# Patient Record
Sex: MALE | Race: WHITE | Employment: FULL TIME | URBAN - METROPOLITAN AREA
[De-identification: names, ages, dates, MRNs, and addresses within clinical notes are randomized per-mention and may not be internally consistent; named-entity substitution may affect disease eponyms.]

---

## 2023-03-07 ENCOUNTER — HOSPITAL ENCOUNTER (EMERGENCY)
Age: 68
Discharge: HOME | End: 2023-03-07
Attending: EMERGENCY MEDICINE

## 2023-03-07 ENCOUNTER — APPOINTMENT (OUTPATIENT)
Dept: GENERAL RADIOLOGY | Age: 68
End: 2023-03-07

## 2023-03-07 VITALS
TEMPERATURE: 98.6 F | HEART RATE: 60 BPM | OXYGEN SATURATION: 96 % | DIASTOLIC BLOOD PRESSURE: 88 MMHG | RESPIRATION RATE: 15 BRPM | SYSTOLIC BLOOD PRESSURE: 125 MMHG

## 2023-03-07 DIAGNOSIS — S83.91XA SPRAIN OF RIGHT KNEE, UNSPECIFIED LIGAMENT, INITIAL ENCOUNTER: Primary | ICD-10-CM

## 2023-03-07 PROCEDURE — 99283 EMERGENCY DEPT VISIT LOW MDM: CPT

## 2023-03-07 PROCEDURE — 73562 X-RAY EXAM OF KNEE 3: CPT

## 2023-03-07 PROCEDURE — 6370000000 HC RX 637 (ALT 250 FOR IP)

## 2023-03-07 RX ORDER — HYDROCODONE BITARTRATE AND ACETAMINOPHEN 5; 325 MG/1; MG/1
1 TABLET ORAL
Status: COMPLETED | OUTPATIENT
Start: 2023-03-07 | End: 2023-03-07

## 2023-03-07 RX ADMIN — HYDROCODONE BITARTRATE AND ACETAMINOPHEN 1 TABLET: 5; 325 TABLET ORAL at 11:02

## 2023-03-07 ASSESSMENT — ENCOUNTER SYMPTOMS
SINUS PRESSURE: 0
EYE REDNESS: 0
ABDOMINAL PAIN: 0
COUGH: 0
SHORTNESS OF BREATH: 0
WHEEZING: 0
COLOR CHANGE: 0
VOMITING: 0
CONSTIPATION: 0
CHEST TIGHTNESS: 0
NAUSEA: 0
BACK PAIN: 0
PHOTOPHOBIA: 0
SINUS PAIN: 0

## 2023-03-07 NOTE — ED TRIAGE NOTES
Pt arrives to triage in a wheelchair. Pt reports he was stepping down off of a truck and felt his R knee \"go out of place and then back in\" and now has severe pain and cannot bear weight on the knee. Pt states this happened about one hour ago.

## 2023-03-07 NOTE — ED PROVIDER NOTES
Emergency Department Provider Note                   PCP:                Jackeline Mcpherson MD               Age: 79 y.o. Sex: male     DISPOSITION Decision To Discharge 03/07/2023 11:21:22 AM       ICD-10-CM    1. Sprain of right knee, unspecified ligament, initial encounter  S83. [employer] Knee Immobilizer, Right; 16\"; Crutches; Pair, Right Side Injury; Med (5'2\"-5'10\")     [facility] [employer]           [address]      Category 3: Discussion of management or test interpretation. 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 Differential diagnosis considered include but is not limited to ligamentous tear, fracture, soft tissue injury. Physical exam shows negative Lachman's and posterior drawer test.  He is no erythema, laceration, or cellulitis to the knee. X-ray shows no acute fracture or dislocation. We will treat the patient with knee immobilizer and crutches and give urgent referral to orthopedics for further evaluation. We discussed return precautions and conservative care measures. Patient verbalizes understanding agreement with the plan. ED Course as of 03/07/23 1857   Tue Mar 07, 2023   1050 IMPRESSION:  1. No dislocation or displaced fracture. 2.  Early medial compartment joint space narrowing. [KS]      ED Course User Index  [KS] SARITA Lopez       Risk of Complications and/or Morbidity of Patient Management:  OTC drug management performed     Is this patient to be included in the SEP-1 core measure due to severe sepsis or septic shock? No Exclusion criteria - the patient is NOT to be included for SEP-1 Core Measure due to:  Infection is not suspected     Catherine Hogue is a 79 y.o. male who presents to the Emergency Department with chief complaint of    Chief Complaint   Patient presents with    Knee Pain      Patient is a 31-year-old male with a past medical history of hypertension and hyperlipidemia presents today due to right knee pain after climbing down from his truck and feeling a pop. Patient was climbing down from the top of his truck when he stepped down on the ground and felt his right knee \"pop\" and slide out of place. He then fell. He denies hitting his head or losing consciousness. He denies hurting anywhere else on his body. Patient has been unable to put any pressure or walk on his right knee due to pain. Is a 5 out of 10 in severity. He states his kneecap slid out of place but it is since gone back into place. He has had no history of trauma or surgery to this right knee. He denies all other symptoms at this time. The history is provided by the patient. No  was used. Review of Systems   Constitutional:  Negative for chills and fatigue. HENT:  Negative for congestion, sinus pressure, sinus pain and sneezing. Eyes:  Negative for photophobia and redness. Respiratory:  Negative for cough, chest tightness, shortness of breath and wheezing. Cardiovascular:  Negative for chest pain and leg swelling. Gastrointestinal:  Negative for abdominal pain, constipation, nausea and vomiting. Genitourinary:  Negative for dysuria, flank pain and hematuria. Musculoskeletal:  Positive for arthralgias, gait problem and joint swelling. Negative for back pain, neck pain and neck stiffness. Skin:  Negative for color change. Neurological:  Negative for dizziness, light-headedness, numbness and headaches. All other systems reviewed and are negative. Vitals signs and nursing note reviewed. No data found. Physical Exam  Vitals and nursing note reviewed. Constitutional:       General: He is not in acute distress. Appearance: Normal appearance. He is not ill-appearing, toxic-appearing or diaphoretic.    HENT: Head: Normocephalic and atraumatic. Eyes:      General: No scleral icterus. Right eye: No discharge. Left eye: No discharge. Extraocular Movements: Extraocular movements intact. Conjunctiva/sclera: Conjunctivae normal.      Pupils: Pupils are equal, round, and reactive to light. Neck:      Comments: No midline spinal tenderness of the cervical, thoracic, or lumbar spine. Cardiovascular:      Rate and Rhythm: Normal rate and regular rhythm. Pulses: Normal pulses. Heart sounds: Normal heart sounds. No murmur heard. No friction rub. No gallop. Comments: 2+ dorsalis pedis pulses bilaterally. Pulmonary:      Effort: Pulmonary effort is normal. No respiratory distress. Breath sounds: Normal breath sounds. No stridor. No wheezing or rales. Musculoskeletal:         General: Swelling, tenderness and signs of injury present. No deformity. Cervical back: Normal range of motion and neck supple. No rigidity or tenderness. Right lower leg: No edema. Left lower leg: No edema. Comments: Decreased active range of motion in right knee due to pain. Normal passive range of motion in right knee. Moderate tenderness to palpation to lateral right knee. Negative Lachman's and posterior drawer test.  Patella appears to be in anatomic position. No erythema, cellulitis, or lacerations noted. No pain to palpation of patient's right hip or right ankle. Normal range of motion of upper limbs bilaterally. Lymphadenopathy:      Cervical: No cervical adenopathy. Skin:     General: Skin is warm and dry. Capillary Refill: Capillary refill takes less than 2 seconds. Neurological:      General: No focal deficit present. Mental Status: He is alert and oriented to person, place, and time. Mental status is at baseline.       Gait: Gait abnormal.   Psychiatric:         Mood and Affect: Mood normal.         Behavior: Behavior normal.         Thought Content: Thought content normal.         Judgment: Judgment normal.        Procedures    ED Course as of 03/07/23 1857   Tue Mar 07, 2023   1050 IMPRESSION:  1.  No dislocation or displaced fracture.  2.  Early medial compartment joint space narrowing. [KS]      ED Course User Index  [KS] SARITA Solorzano        Orders Placed This Encounter   Procedures    XR KNEE RIGHT (3 VIEWS)    [facility]    [facility] Knee Immobilizer, Right; 16\"; Crutches; Pair, Right Side Injury; Med (5'2\"-5'10\")        Medications   HYDROcodone-acetaminophen (NORCO) 5-325 MG per tablet 1 tablet (1 tablet Oral Given 3/7/23 1102)       There are no discharge medications for this patient.       No past medical history on file.     No past surgical history on file.     No family history on file.     Social History     Socioeconomic History    Marital status:         Allergies: Patient has no known allergies.    There are no discharge medications for this patient.       Results for orders placed or performed during the hospital encounter of 03/07/23   XR KNEE RIGHT (3 VIEWS)    Narrative    EXAMINATION: XR KNEE RIGHT (3 VIEWS) 3/7/2023 10:25 AM    ACCESSION NUMBER: [id number]    COMPARISON: None available    INDICATION: fall, \"popping\" sensation    TECHNIQUE: 3 views of the right knee were obtained.     FINDINGS:   No acute fracture or dislocation. Mild medial compartment joint space narrowing.  No evidence of knee joint effusion. Quadriceps enthesopathy        Impression    1.  No dislocation or displaced fracture.  2.  Early medial compartment joint space narrowing.            XR KNEE RIGHT (3 VIEWS)   Final Result   1.  No dislocation or displaced fracture.   2.  Early medial compartment joint space narrowing.                              Voice dictation software was used during the making of this note.  This software is not perfect and grammatical and other  typographical errors may be present. This note has not been completely proofread for errors.        SARITA Whyte  03/07/23 [id number]

## 2023-03-07 NOTE — DISCHARGE INSTRUCTIONS
Rest, ice, and elevate your right knee. Use Tylenol and ibuprofen to help with your pain. Wear your knee immobilizer and use the crutches when you are walking around. If you have any new or worsening symptoms, please return here for further evaluation. Please be sure to follow-up with orthopedics for further evaluation of your knee. We would love to help you get a primary care doctor for follow-up after your emergency department visit. Please call [phone] between 7AM - 6PM Monday to Friday. A care navigator will be able to assist you with setting up a doctor close to your home.

## 2023-03-07 NOTE — ED NOTES
I have reviewed discharge instructions with the patient. The patient verbalized understanding. Patient left ED via Discharge Method: crutches to Home with friend. Opportunity for questions and clarification provided. Patient given 2 scripts. To continue your aftercare when you leave the hospital, you may receive an automated call from our care team to check in on how you are doing. This is a free service and part of our promise to provide the best care and service to meet your aftercare needs.  If you have questions, or wish to unsubscribe from this service please call [phone]. Thank you for Choosing our [employer] Emergency Department.         Yakov Marquez RN  03/07/23 1124

## 2023-03-07 NOTE — Clinical Note
Bea Jarvis was seen and treated in our emergency department on 3/7/2023. He may return to work on 03/08/2023. If you have any questions or concerns, please don't hesitate to call.       SARITA Perry